# Patient Record
(demographics unavailable — no encounter records)

---

## 2024-10-29 NOTE — PHYSICAL EXAM
[Alert] : alert [No Acute Distress] : no acute distress [Playful] : playful [Normocephalic] : normocephalic [Conjunctivae with no discharge] : conjunctivae with no discharge [PERRL] : PERRL [EOMI Bilateral] : EOMI bilateral [Auricles Well Formed] : auricles well formed [Clear Tympanic membranes with present light reflex and bony landmarks] : clear tympanic membranes with present light reflex and bony landmarks [No Discharge] : no discharge [Nares Patent] : nares patent [Pink Nasal Mucosa] : pink nasal mucosa [Palate Intact] : palate intact [Uvula Midline] : uvula midline [Nonerythematous Oropharynx] : nonerythematous oropharynx [No Caries] : no caries [Trachea Midline] : trachea midline [Supple, full passive range of motion] : supple, full passive range of motion [No Palpable Masses] : no palpable masses [Symmetric Chest Rise] : symmetric chest rise [Clear to Auscultation Bilaterally] : clear to auscultation bilaterally [Normoactive Precordium] : normoactive precordium [Regular Rate and Rhythm] : regular rate and rhythm [Normal S1, S2 present] : normal S1, S2 present [No Murmurs] : no murmurs [+2 Femoral Pulses] : +2 femoral pulses [Soft] : soft [NonTender] : non tender [Non Distended] : non distended [Normoactive Bowel Sounds] : normoactive bowel sounds [No Hepatomegaly] : no hepatomegaly [No Splenomegaly] : no splenomegaly [Nehemiah 1] : Nehemiah 1 [Central Urethral Opening] : central urethral opening [Testicles Descended Bilaterally] : testicles descended bilaterally [No Abnormal Lymph Nodes Palpated] : no abnormal lymph nodes palpated [No Gait Asymmetry] : no gait asymmetry [No pain or deformities with palpation of bone, muscles, joints] : no pain or deformities with palpation of bone, muscles, joints [Normal Muscle Tone] : normal muscle tone [No Spinal Dimple] : no spinal dimple [NoTuft of Hair] : no tuft of hair [Straight] : straight [Cranial Nerves Grossly Intact] : cranial nerves grossly intact [No Rash or Lesions] : no rash or lesions [Circumcised] : circumcised

## 2024-10-29 NOTE — DISCUSSION/SUMMARY
[Normal Growth] : growth [Normal Development] : development  [No Elimination Concerns] : elimination [Continue Regimen] : feeding [No Skin Concerns] : skin [Normal Sleep Pattern] : sleep [School Readiness] : school readiness [Healthy Personal Habits] : healthy personal habits [TV/Media] : tv/media [Child and Family Involvement] : child and family involvement [Safety] : safety [DTaP] : diptheria, tetanus and pertussis [Influenza] : influenza [No Medications] : ~He/She~ is not on any medications [IPV] : inactivated poliovirus [MMR] : measles, mumps and rubella [FreeTextEntry1] : Ariz is a 3 y/o M no pmh in clinic for routine well visit. +congestion and PND on exam, otherwise well  Seasonal Congestion - advised to trial Zyrtec nightly for one month, may be allergic component  Health Maintenance - growing and developing well, toilet trained, eating variety of foods but small portions - MMR, DTAP, IPV administered (received extra doses of vaccines due to travel <1yr). Flu offered - CBC and Lead check - Follow up in 1yr for next well visit or sooner as needed

## 2024-10-29 NOTE — DISCUSSION/SUMMARY
[Normal Growth] : growth [Normal Development] : development  [No Elimination Concerns] : elimination [Continue Regimen] : feeding [No Skin Concerns] : skin [Normal Sleep Pattern] : sleep [School Readiness] : school readiness [Healthy Personal Habits] : healthy personal habits [TV/Media] : tv/media [Child and Family Involvement] : child and family involvement [Safety] : safety [DTaP] : diptheria, tetanus and pertussis [Influenza] : influenza [No Medications] : ~He/She~ is not on any medications [IPV] : inactivated poliovirus [MMR] : measles, mumps and rubella [FreeTextEntry1] : Ariz is a 5 y/o M no pmh in clinic for routine well visit. +congestion and PND on exam, otherwise well  Seasonal Congestion - advised to trial Zyrtec nightly for one month, may be allergic component  Health Maintenance - growing and developing well, toilet trained, eating variety of foods but small portions - MMR, DTAP, IPV administered (received extra doses of vaccines due to travel <1yr). Flu offered - CBC and Lead check - Follow up in 1yr for next well visit or sooner as needed

## 2024-10-29 NOTE — HISTORY OF PRESENT ILLNESS
[Mother] : mother [whole ___ oz/d] : consumes [unfilled] oz of whole cow's milk per day [Sugar drinks] : sugar drinks [Fruit] : fruit [Vegetables] : vegetables [Meat] : meat [Grains] : grains [Eggs] : eggs [Dairy] : dairy [___ stools per day] : [unfilled]  stools per day [___ voids per day] : [unfilled] voids per day [Toilet Trained] : toilet trained [Brushing teeth] : Brushing teeth [Yes] : Patient goes to dentist yearly [In Pre-K] : In Pre-K [Playtime (60 min/d)] : Playtime 60 min a day [Up to date] : Up to date [Appropiate parent-child communication] : Appropriate parent-child communication [FreeTextEntry7] : nasal congestion, snoring, difficulty sleeping. just started school. mom using saline drops and vicks under nose [de-identified] : small portions [FreeTextEntry3] : sleeps with mom

## 2024-10-29 NOTE — HISTORY OF PRESENT ILLNESS
[Mother] : mother [whole ___ oz/d] : consumes [unfilled] oz of whole cow's milk per day [Sugar drinks] : sugar drinks [Fruit] : fruit [Vegetables] : vegetables [Meat] : meat [Grains] : grains [Eggs] : eggs [Dairy] : dairy [___ stools per day] : [unfilled]  stools per day [___ voids per day] : [unfilled] voids per day [Toilet Trained] : toilet trained [Brushing teeth] : Brushing teeth [Yes] : Patient goes to dentist yearly [In Pre-K] : In Pre-K [Playtime (60 min/d)] : Playtime 60 min a day [Up to date] : Up to date [Appropiate parent-child communication] : Appropriate parent-child communication [FreeTextEntry7] : nasal congestion, snoring, difficulty sleeping. just started school. mom using saline drops and vicks under nose [de-identified] : small portions [FreeTextEntry3] : sleeps with mom

## 2024-10-29 NOTE — HISTORY OF PRESENT ILLNESS
[DTaP/IPV] : DTaP/IPV [MMR] : MMR [FreeTextEntry1] : R. thigh: Quadracel      L. thigh: MMR         Pt tolerated well.

## 2024-10-29 NOTE — DEVELOPMENTAL MILESTONES
[Goes to the bathroom and has] : goes to bathroom and has bowel movement by self [Dresses and undresses without] : dresses and undresses without much help [Plays make-believe] : plays make-believe [Uses 4-word sentences] : uses 4-word sentences [Uses words that are 100%] : uses words that are 100% intelligible to strangers [Tells a story from a book] : tells a story from a book [Climbs stairs, alternating feet] : climbs stairs, alternating feet without support [Skips on one foot] : skips on one foot [Unbuttons medium-sized buttons] : unbuttons medium sized buttons [Grasps a pencil with thumb and] : grasps a pencil with thumb and fingers instead of fist [Draws recognizable pictures] : draws recognizable pictures [None] : none

## 2024-10-29 NOTE — HISTORY OF PRESENT ILLNESS
no [Mother] : mother [whole ___ oz/d] : consumes [unfilled] oz of whole cow's milk per day [Sugar drinks] : sugar drinks [Fruit] : fruit [Vegetables] : vegetables [Meat] : meat [Grains] : grains [Eggs] : eggs [Dairy] : dairy [___ stools per day] : [unfilled]  stools per day [___ voids per day] : [unfilled] voids per day [Toilet Trained] : toilet trained [Brushing teeth] : Brushing teeth [Yes] : Patient goes to dentist yearly [In Pre-K] : In Pre-K [Playtime (60 min/d)] : Playtime 60 min a day [Up to date] : Up to date [Appropiate parent-child communication] : Appropriate parent-child communication [FreeTextEntry7] : nasal congestion, snoring, difficulty sleeping. just started school. mom using saline drops and vicks under nose [de-identified] : small portions [FreeTextEntry3] : sleeps with mom